# Patient Record
Sex: FEMALE | URBAN - NONMETROPOLITAN AREA
[De-identification: names, ages, dates, MRNs, and addresses within clinical notes are randomized per-mention and may not be internally consistent; named-entity substitution may affect disease eponyms.]

---

## 2021-05-11 ENCOUNTER — TELEPHONE (OUTPATIENT)
Dept: OBSTETRICS AND GYNECOLOGY | Facility: CLINIC | Age: 36
End: 2021-05-11

## 2024-06-17 ENCOUNTER — OFFICE VISIT (OUTPATIENT)
Dept: FAMILY MEDICINE | Facility: CLINIC | Age: 39
End: 2024-06-17
Payer: COMMERCIAL

## 2024-06-17 VITALS
OXYGEN SATURATION: 98 % | SYSTOLIC BLOOD PRESSURE: 142 MMHG | TEMPERATURE: 98 F | HEIGHT: 64 IN | HEART RATE: 115 BPM | DIASTOLIC BLOOD PRESSURE: 90 MMHG | WEIGHT: 251 LBS | RESPIRATION RATE: 18 BRPM | BODY MASS INDEX: 42.85 KG/M2

## 2024-06-17 DIAGNOSIS — R00.2 PALPITATIONS: ICD-10-CM

## 2024-06-17 DIAGNOSIS — E66.01 CLASS 3 SEVERE OBESITY WITH BODY MASS INDEX (BMI) OF 40.0 TO 44.9 IN ADULT, UNSPECIFIED OBESITY TYPE, UNSPECIFIED WHETHER SERIOUS COMORBIDITY PRESENT: ICD-10-CM

## 2024-06-17 DIAGNOSIS — R42 EPISODIC LIGHTHEADEDNESS: ICD-10-CM

## 2024-06-17 DIAGNOSIS — R61 DIAPHORESIS: ICD-10-CM

## 2024-06-17 DIAGNOSIS — R63.5 WEIGHT GAIN: Primary | ICD-10-CM

## 2024-06-17 DIAGNOSIS — R35.89 POLYURIA: ICD-10-CM

## 2024-06-17 DIAGNOSIS — R45.89 ANXIETY ABOUT HEALTH: ICD-10-CM

## 2024-06-17 DIAGNOSIS — Z12.4 ENCOUNTER FOR SCREENING FOR CERVICAL CANCER: ICD-10-CM

## 2024-06-17 DIAGNOSIS — N92.6 IRREGULAR MENSES: ICD-10-CM

## 2024-06-17 LAB
ALBUMIN SERPL BCP-MCNC: 4 G/DL (ref 3.5–5)
ALBUMIN/GLOB SERPL: 1 {RATIO}
ALP SERPL-CCNC: 67 U/L (ref 37–98)
ALT SERPL W P-5'-P-CCNC: 104 U/L (ref 13–56)
AMORPHOUS CRYSTALS, UA (OHS): ABNORMAL /HPF
ANION GAP SERPL CALCULATED.3IONS-SCNC: 13 MMOL/L (ref 7–16)
AST SERPL W P-5'-P-CCNC: 66 U/L (ref 15–37)
BASOPHILS # BLD AUTO: 0.06 K/UL (ref 0–0.2)
BASOPHILS NFR BLD AUTO: 0.9 % (ref 0–1)
BILIRUB SERPL-MCNC: 0.4 MG/DL (ref ?–1.2)
BILIRUB UR QL STRIP: NEGATIVE
BUN SERPL-MCNC: 11 MG/DL (ref 7–18)
BUN/CREAT SERPL: 15 (ref 6–20)
CALCIUM SERPL-MCNC: 9.3 MG/DL (ref 8.5–10.1)
CHLORIDE SERPL-SCNC: 106 MMOL/L (ref 98–107)
CLARITY UR: ABNORMAL
CO2 SERPL-SCNC: 23 MMOL/L (ref 21–32)
COLOR UR: ABNORMAL
CREAT SERPL-MCNC: 0.73 MG/DL (ref 0.55–1.02)
DIFFERENTIAL METHOD BLD: ABNORMAL
EGFR (NO RACE VARIABLE) (RUSH/TITUS): 109 ML/MIN/1.73M2
EOSINOPHIL # BLD AUTO: 0.07 K/UL (ref 0–0.5)
EOSINOPHIL NFR BLD AUTO: 1.1 % (ref 1–4)
ERYTHROCYTE [DISTWIDTH] IN BLOOD BY AUTOMATED COUNT: 12.2 % (ref 11.5–14.5)
EST. AVERAGE GLUCOSE BLD GHB EST-MCNC: 97 MG/DL
GLOBULIN SER-MCNC: 4.2 G/DL (ref 2–4)
GLUCOSE SERPL-MCNC: 93 MG/DL (ref 74–106)
GLUCOSE UR STRIP-MCNC: NORMAL MG/DL
HBA1C MFR BLD HPLC: 5 % (ref 4.5–6.6)
HCT VFR BLD AUTO: 39.5 % (ref 38–47)
HGB BLD-MCNC: 13.5 G/DL (ref 12–16)
IMM GRANULOCYTES # BLD AUTO: 0.02 K/UL (ref 0–0.04)
IMM GRANULOCYTES NFR BLD: 0.3 % (ref 0–0.4)
KETONES UR STRIP-SCNC: NEGATIVE MG/DL
LEUKOCYTE ESTERASE UR QL STRIP: NEGATIVE
LYMPHOCYTES # BLD AUTO: 1.56 K/UL (ref 1–4.8)
LYMPHOCYTES NFR BLD AUTO: 23.8 % (ref 27–41)
MCH RBC QN AUTO: 33.3 PG (ref 27–31)
MCHC RBC AUTO-ENTMCNC: 34.2 G/DL (ref 32–36)
MCV RBC AUTO: 97.5 FL (ref 80–96)
MONOCYTES # BLD AUTO: 0.57 K/UL (ref 0–0.8)
MONOCYTES NFR BLD AUTO: 8.7 % (ref 2–6)
MPC BLD CALC-MCNC: 10.8 FL (ref 9.4–12.4)
NEUTROPHILS # BLD AUTO: 4.28 K/UL (ref 1.8–7.7)
NEUTROPHILS NFR BLD AUTO: 65.2 % (ref 53–65)
NITRITE UR QL STRIP: NEGATIVE
NRBC # BLD AUTO: 0 X10E3/UL
NRBC, AUTO (.00): 0 %
PH UR STRIP: 5 PH UNITS
PLATELET # BLD AUTO: 294 K/UL (ref 150–400)
POTASSIUM SERPL-SCNC: 4.5 MMOL/L (ref 3.5–5.1)
PROT SERPL-MCNC: 8.2 G/DL (ref 6.4–8.2)
PROT UR QL STRIP: NEGATIVE
RBC # BLD AUTO: 4.05 M/UL (ref 4.2–5.4)
RBC # UR STRIP: ABNORMAL /UL
RBC #/AREA URNS HPF: 5 /HPF
SODIUM SERPL-SCNC: 137 MMOL/L (ref 136–145)
SP GR UR STRIP: 1.02
SQUAMOUS #/AREA URNS LPF: ABNORMAL /HPF
TSH SERPL DL<=0.005 MIU/L-ACNC: 1.55 UIU/ML (ref 0.36–3.74)
UROBILINOGEN UR STRIP-ACNC: NORMAL MG/DL
WBC # BLD AUTO: 6.56 K/UL (ref 4.5–11)
WBC #/AREA URNS HPF: <1 /HPF

## 2024-06-17 PROCEDURE — 87086 URINE CULTURE/COLONY COUNT: CPT | Mod: ,,, | Performed by: CLINICAL MEDICAL LABORATORY

## 2024-06-17 PROCEDURE — 84443 ASSAY THYROID STIM HORMONE: CPT | Mod: ,,, | Performed by: CLINICAL MEDICAL LABORATORY

## 2024-06-17 PROCEDURE — 81001 URINALYSIS AUTO W/SCOPE: CPT | Mod: ,,, | Performed by: CLINICAL MEDICAL LABORATORY

## 2024-06-17 PROCEDURE — 80053 COMPREHEN METABOLIC PANEL: CPT | Mod: ,,, | Performed by: CLINICAL MEDICAL LABORATORY

## 2024-06-17 PROCEDURE — 3008F BODY MASS INDEX DOCD: CPT | Mod: ,,, | Performed by: NURSE PRACTITIONER

## 2024-06-17 PROCEDURE — 85025 COMPLETE CBC W/AUTO DIFF WBC: CPT | Mod: ,,, | Performed by: CLINICAL MEDICAL LABORATORY

## 2024-06-17 PROCEDURE — 1160F RVW MEDS BY RX/DR IN RCRD: CPT | Mod: ,,, | Performed by: NURSE PRACTITIONER

## 2024-06-17 PROCEDURE — 3078F DIAST BP <80 MM HG: CPT | Mod: ,,, | Performed by: NURSE PRACTITIONER

## 2024-06-17 PROCEDURE — 3044F HG A1C LEVEL LT 7.0%: CPT | Mod: ,,, | Performed by: NURSE PRACTITIONER

## 2024-06-17 PROCEDURE — 1159F MED LIST DOCD IN RCRD: CPT | Mod: ,,, | Performed by: NURSE PRACTITIONER

## 2024-06-17 PROCEDURE — 99203 OFFICE O/P NEW LOW 30 MIN: CPT | Mod: ,,, | Performed by: NURSE PRACTITIONER

## 2024-06-17 PROCEDURE — 80050 GENERAL HEALTH PANEL: CPT | Mod: ,,, | Performed by: CLINICAL MEDICAL LABORATORY

## 2024-06-17 PROCEDURE — 3074F SYST BP LT 130 MM HG: CPT | Mod: ,,, | Performed by: NURSE PRACTITIONER

## 2024-06-17 PROCEDURE — 83036 HEMOGLOBIN GLYCOSYLATED A1C: CPT | Mod: ,,, | Performed by: CLINICAL MEDICAL LABORATORY

## 2024-06-17 NOTE — PROGRESS NOTES
"   PITER Caicedo   [facility]  [address] [facility] [address]  [phone]      PATIENT NAME: Jayde Schaefer  : 1985  DATE: 24  MRN: [id number]      Billing Provider: PITER Caicedo  Level of Service: [place] OFFICE/OUTPT VISIT, CARY MURRELL III, 30-44 MIN  Patient PCP Information       Provider PCP Type    Primary Doctor No General            Reason for Visit / Chief Complaint: glucose concerns    Update PCP  Update Chief Complaint         History of Present Illness / Problem Focused Workflow     presents to the clinic with c/o suspected low blood sugar.    Noticed May 13 some peripheral edema ankles, now resolved  Went to [place] around same time, ate and then got "panicky," did not get on the ride she was in line for  Then had nausea for 2 hours, no vomiting or diarrhea  Nausea was in waves, better next am  , went to [place], got too hot setting up a tent, lightheaded  Better after a few minutes and misting with cold water  Same afternoon of beach incident, "did not feel right " after eating grilled shrimp and chicken and sweet tea  Could not sleep due to "panicky" feeling  Had some mild palpitations during HS  Walks early mornings when able for fasting cardio exercise  Has had "fight or flight" after eating Mexican food two days ago  Has not checked glucose with events  No hx diabetes  No cardiac conditions  Has gained weight over last year  LMP , no missed periods  Menstrual cycle is 28 days most of the time, has gone up to 34 days between cycles  Two miscarriages 2017-no living children  Gyn care 2017--saw midwife  Not on birth control, uses condoms for prevention  No meds  Mat GM--pancreatic cancer ()    Drinking water seems to calm s/s when the occur          Review of Systems     Review of Systems   Constitutional:  Positive for fatigue and unexpected weight change. Negative for appetite change and fever. "   HENT:  Negative for nasal congestion, nosebleeds, postnasal drip and trouble swallowing.    Respiratory:  Negative for cough.    Cardiovascular:  Positive for palpitations. Negative for chest pain, leg swelling and claudication.        Occasional palpitations.  Not at present.   Gastrointestinal:  Negative for abdominal distention, abdominal pain, anal bleeding, blood in stool, change in bowel habit, constipation, diarrhea, nausea, vomiting and reflux.   Endocrine: Negative for polydipsia and polyuria.   Genitourinary:  Positive for menstrual irregularity. Negative for dysuria, pelvic pain and vaginal discharge.   Musculoskeletal:  Negative for arthralgias, gait problem and myalgias.   Integumentary:  Negative for rash.   Neurological:  Positive for dizziness. Negative for seizures, syncope, speech difficulty, weakness, light-headedness and headaches.        Dizziness during reported episodes but none at present.   Psychiatric/Behavioral:  Negative for sleep disturbance and suicidal ideas. The patient is nervous/anxious.         Denies depression  Reports that she does feel anxious at times when the reported episodes occur.        Medical / Social / Family History   History reviewed. No pertinent past medical history.    History reviewed. No pertinent surgical history.    Social History  Ms. Schaefer  reports that she has never smoked. She has never been exposed to tobacco smoke. She has never used smokeless tobacco. She reports that she does not drink alcohol and does not use drugs.    Family History  Ms. Schaefer's family history is not on file.    Medications and Allergies     Medications  No outpatient medications have been marked as taking for the 6/17/24 encounter (Office Visit) with Vivi Jacome FNP.       Allergies  Review of patient's allergies indicates:  No Known Allergies    Physical Examination     Vitals:    06/17/24 1419   BP: (!) 142/90   Pulse:    Resp:    Temp:      Physical Exam  Vitals and  nursing note reviewed.   Constitutional:       Appearance: Normal appearance. She is obese.   HENT:      Head: Normocephalic and atraumatic.   Eyes:      Conjunctiva/sclera: Conjunctivae normal.   Cardiovascular:      Rate and Rhythm: Normal rate and regular rhythm.      Pulses: Normal pulses.      Heart sounds: Normal heart sounds. No murmur heard.  Pulmonary:      Effort: Pulmonary effort is normal. No respiratory distress.      Breath sounds: Normal breath sounds.   Abdominal:      General: Bowel sounds are normal.      Palpations: Abdomen is soft.   Musculoskeletal:      Cervical back: Normal range of motion.   Skin:     General: Skin is warm and dry.      Capillary Refill: Capillary refill takes less than 2 seconds.      Coloration: Skin is not jaundiced.   Neurological:      Mental Status: She is alert and oriented to person, place, and time.      Comments: Ambulates without difficulty   Psychiatric:         Mood and Affect: Mood normal.         Behavior: Behavior normal.          Lab Results   Component Value Date    WBC 6.56 06/17/2024    HGB 13.5 06/17/2024    HCT 39.5 06/17/2024    MCV 97.5 (H) 06/17/2024     06/17/2024        Sodium   Date Value Ref Range Status   06/17/2024 137 136 - 145 mmol/L Final     Potassium   Date Value Ref Range Status   06/17/2024 4.5 3.5 - 5.1 mmol/L Final     Chloride   Date Value Ref Range Status   06/17/2024 106 98 - 107 mmol/L Final     CO2   Date Value Ref Range Status   06/17/2024 23 21 - 32 mmol/L Final     Glucose   Date Value Ref Range Status   06/17/2024 93 74 - 106 mg/dL Final     BUN   Date Value Ref Range Status   06/17/2024 11 7 - 18 mg/dL Final     Creatinine   Date Value Ref Range Status   06/17/2024 0.73 0.55 - 1.02 mg/dL Final     Calcium   Date Value Ref Range Status   06/17/2024 9.3 8.5 - 10.1 mg/dL Final     Total Protein   Date Value Ref Range Status   06/17/2024 8.2 6.4 - 8.2 g/dL Final     Albumin   Date Value Ref Range Status   06/17/2024 4.0 3.5  "- 5.0 g/dL Final     Bilirubin, Total   Date Value Ref Range Status   06/17/2024 0.4 >0.0 - 1.2 mg/dL Final     Alk Phos   Date Value Ref Range Status   06/17/2024 67 37 - 98 U/L Final     AST   Date Value Ref Range Status   06/17/2024 66 (H) 15 - 37 U/L Final     ALT   Date Value Ref Range Status   06/17/2024 104 (H) 13 - 56 U/L Final     Anion Gap   Date Value Ref Range Status   06/17/2024 13 7 - 16 mmol/L Final     eGFR   Date Value Ref Range Status   06/17/2024 109 >=60 mL/min/1.73m2 Final      Lab Results   Component Value Date    HGBA1C 5.0 06/17/2024      No results found for: "CHOL"  No results found for: "HDL"  No results found for: "LDLCALC"  No results found for: "DLDL"  No results found for: "TRIG"  No results found for: "CHOLHDL"   Lab Results   Component Value Date    TSH 1.550 06/17/2024        Assessment and Plan (including Health Maintenance)      Problem List  Smart Sets  Document Outside HM   :    Plan:     1. Weight gain  -     Comprehensive Metabolic Panel    2. Anxiety about health    3. Diaphoresis  -     Hemoglobin A1C  -     CBC Auto Differential  -     Comprehensive Metabolic Panel  -     Holter monitor - 48 hour; Future    4. Episodic lightheadedness  -     Hemoglobin A1C  -     CBC Auto Differential  -     Comprehensive Metabolic Panel  -     Urinalysis  -     Urine culture  -     POCT EKG 12-LEAD (NOT FOR [facility] USE)  -     Orthostatic vital signs  -     Ambulatory referral/consult to Cardiology; Future; Expected date: 06/24/2024  -     Holter monitor - 48 hour; Future  -     Urinalysis, Microscopic    5. Irregular menses  -     TSH  -     CBC Auto Differential  -     Comprehensive Metabolic Panel  -     POCT Urine Pregnancy    6. Polyuria  -     Urinalysis  -     Urine culture  -     Urinalysis, Microscopic    7. Encounter for screening for cervical cancer  -     Ambulatory referral/consult to Obstetrics / Gynecology; Future; Expected date: 06/24/2024    8. Palpitations  -     Holter " monitor - 48 hour; Future    9. Class 3 severe obesity with body mass index (BMI) of 40.0 to 44.9 in adult, unspecified obesity type, unspecified whether serious comorbidity present         There are no Patient Instructions on file for this visit.     Health Maintenance Due   Topic Date Due    Hepatitis C Screening  Never done    Cervical Cancer Screening  Never done    Lipid Panel  Never done    HIV Screening  Never done    TETANUS VACCINE  Never done    COVID-19 Vaccine (1 - 2023-24 season) Never done         Health Maintenance Topics with due status: Not Due       Topic Last Completion Date    Hemoglobin A1c (Diabetic Prevention Screening) 06/17/2024    Influenza Vaccine Not Due       Future Appointments   Date Time Provider Department Center   11/21/2024 11:00 AM Lei Stevens, DO [facility]        She is overdue for routine gynecological care.  We will refer to gyn.  We will review above labs once available.  We will set up a Holter monitor at [facility] and an appointment with Dr. Tran, per patient's request.  I think it is reasonable to have cardiac evaluation, given the palpitations that occurred during the episodes that she is experiencing.  I have asked her to go to the emergency department should the episode occur again, as it is beneficial to have an assessment done during an episode to rule out EKG changes or low blood sugar.  Patient voiced understanding of teaching.    Orthostatic vital signs were performed with an elevation in blood pressure from laying to sitting to standing.        Signature:  PITER Caicedo  [facility]  [address] [facility] [address]  [phone]    Date of encounter: 6/17/24

## 2024-06-19 LAB — UA COMPLETE W REFLEX CULTURE PNL UR: NORMAL

## 2024-06-27 ENCOUNTER — PATIENT MESSAGE (OUTPATIENT)
Dept: FAMILY MEDICINE | Facility: CLINIC | Age: 39
End: 2024-06-27
Payer: COMMERCIAL

## 2025-05-05 ENCOUNTER — OFFICE VISIT (OUTPATIENT)
Dept: OBSTETRICS AND GYNECOLOGY | Facility: CLINIC | Age: 40
End: 2025-05-05
Payer: COMMERCIAL

## 2025-05-05 VITALS
DIASTOLIC BLOOD PRESSURE: 85 MMHG | RESPIRATION RATE: 18 BRPM | HEART RATE: 106 BPM | WEIGHT: 250 LBS | OXYGEN SATURATION: 99 % | BODY MASS INDEX: 42.68 KG/M2 | HEIGHT: 64 IN | TEMPERATURE: 98 F | SYSTOLIC BLOOD PRESSURE: 133 MMHG

## 2025-05-05 DIAGNOSIS — Z01.419 ENCOUNTER FOR WELL WOMAN EXAM WITH ROUTINE GYNECOLOGICAL EXAM: Primary | ICD-10-CM

## 2025-05-05 DIAGNOSIS — N95.1 PERIMENOPAUSE: ICD-10-CM

## 2025-05-05 PROCEDURE — 3079F DIAST BP 80-89 MM HG: CPT | Mod: ,,, | Performed by: ADVANCED PRACTICE MIDWIFE

## 2025-05-05 PROCEDURE — 99385 PREV VISIT NEW AGE 18-39: CPT | Mod: ,,, | Performed by: ADVANCED PRACTICE MIDWIFE

## 2025-05-05 PROCEDURE — 3008F BODY MASS INDEX DOCD: CPT | Mod: ,,, | Performed by: ADVANCED PRACTICE MIDWIFE

## 2025-05-05 PROCEDURE — 99459 PELVIC EXAMINATION: CPT | Mod: ,,, | Performed by: ADVANCED PRACTICE MIDWIFE

## 2025-05-05 PROCEDURE — 3075F SYST BP GE 130 - 139MM HG: CPT | Mod: ,,, | Performed by: ADVANCED PRACTICE MIDWIFE

## 2025-05-05 PROCEDURE — 1159F MED LIST DOCD IN RCRD: CPT | Mod: ,,, | Performed by: ADVANCED PRACTICE MIDWIFE

## 2025-05-05 PROCEDURE — 88142 CYTOPATH C/V THIN LAYER: CPT | Mod: TC,GCY | Performed by: ADVANCED PRACTICE MIDWIFE

## 2025-05-08 LAB
GH SERPL-MCNC: NORMAL NG/ML
INSULIN SERPL-ACNC: NORMAL U[IU]/ML
LAB AP CLINICAL INFORMATION: NORMAL
LAB AP GYN INTERPRETATION: NEGATIVE
LAB AP PAP DISCLAIMER COMMENTS: NORMAL
RENIN PLAS-CCNC: NORMAL NG/ML/H

## 2025-06-24 PROCEDURE — 88342 IMHCHEM/IMCYTCHM 1ST ANTB: CPT | Mod: 26,,, | Performed by: PATHOLOGY

## 2025-06-24 PROCEDURE — 88305 TISSUE EXAM BY PATHOLOGIST: CPT | Mod: 26,,, | Performed by: PATHOLOGY

## 2025-06-24 PROCEDURE — 88341 IMHCHEM/IMCYTCHM EA ADD ANTB: CPT | Mod: 26,,, | Performed by: PATHOLOGY

## 2025-06-24 PROCEDURE — 88341 IMHCHEM/IMCYTCHM EA ADD ANTB: CPT | Mod: TC,SUR | Performed by: DERMATOLOGY

## 2025-06-25 ENCOUNTER — LAB REQUISITION (OUTPATIENT)
Dept: LAB | Facility: HOSPITAL | Age: 40
End: 2025-06-25
Attending: DERMATOLOGY
Payer: COMMERCIAL

## 2025-06-25 DIAGNOSIS — D49.2 NEOPLASM OF UNSPECIFIED BEHAVIOR OF BONE, SOFT TISSUE, AND SKIN: ICD-10-CM

## 2025-06-26 LAB
ESTROGEN SERPL-MCNC: NORMAL PG/ML
INSULIN SERPL-ACNC: NORMAL U[IU]/ML
LAB AP GROSS DESCRIPTION: NORMAL
LAB AP LABORATORY NOTES: NORMAL
LAB AP SPEC A DDX: NORMAL
LAB AP SPEC A MORPHOLOGY: NORMAL
LAB AP SPEC A PROCEDURE: NORMAL
T3RU NFR SERPL: NORMAL %